# Patient Record
Sex: FEMALE | Race: OTHER
[De-identification: names, ages, dates, MRNs, and addresses within clinical notes are randomized per-mention and may not be internally consistent; named-entity substitution may affect disease eponyms.]

---

## 2018-09-28 ENCOUNTER — HOSPITAL ENCOUNTER (EMERGENCY)
Dept: HOSPITAL 80 - CED | Age: 23
Discharge: HOME | End: 2018-09-28
Payer: COMMERCIAL

## 2018-09-28 VITALS — DIASTOLIC BLOOD PRESSURE: 75 MMHG | SYSTOLIC BLOOD PRESSURE: 110 MMHG

## 2018-09-28 DIAGNOSIS — N90.7: Primary | ICD-10-CM

## 2018-09-28 PROCEDURE — 0H9AXZZ DRAINAGE OF INGUINAL SKIN, EXTERNAL APPROACH: ICD-10-PCS | Performed by: EMERGENCY MEDICINE

## 2018-09-28 NOTE — EDPHY
H & P


Stated Complaint: vagina cyst with pain 5 days


Time Seen by Provider: 09/28/18 17:15


HPI/ROS: 





CHIEF COMPLAINT:  Cyst on clitoris





History by patient





HISTORY OF PRESENT ILLNESS:  23-year-old otherwise healthy woman presents 

complaining of swollen painful area on her clitoris which began 3-4 days ago 

and has been getting progressively worse.  She states now it is so painful she 

can't sit with her legs close.  There has been no drainage.  She denies any 

trauma.  She has not put anything on it.  She has never had anything like this 

before.  She was unable to get an appointment with a gynecologist until next 

week.  Her mom is a nurse and suggested she come to the ER.








REVIEW OF SYSTEMS:


As in HPI, and all other systems reviewed and are negative


Source: Patient





- Personal History


LMP (Females 10-55): Over 28 Days Ago


Current Tetanus Diphtheria and Acellular Pertussis (TDAP): Yes





- Medical/Surgical History


Hx Asthma: No


Hx Chronic Respiratory Disease: No


Hx Diabetes: No


Hx Cardiac Disease: No


Hx Renal Disease: No


Hx Cirrhosis: No


Hx Alcoholism: No


Hx HIV/AIDS: No


Hx Splenectomy or Spleen Trauma: No


Other PMH: hymenectomy 2015





- Social History


Smoking Status: Never smoked





- Physical Exam


Exam: 





General Appearance:  Alert, well-appearing


Head: normocephalic, atraumatic


Eyes:  Pupils equal and round, reactive to light, no pallor or injection.


Mouth:  Mucous membranes moist.  Oropharynx clear


Neck:  No bony tenderness, full range of motion


Gastrointestinal:  Abdomen is soft and nontender, no masses, bowel sounds 

normal.


:  Positive 1 x 1 cm rounded cystic lesion with obvious purulence, induration 

and marked tenderness on the clitoris, labia and vaginal introitus within 

normal limits


Neurological:  Awake, alert and oriented x 3, cranial nerves 2-12 intact, no 

pronator drift, normal gait, 


Skin:  Warm and dry, no rashes.


Musculoskeletal:   No deformities or tenderness.


Extremities: full range of motion, no edema, DP2+ bilat


Psychiatric:  Patient has normal affect, there is no agitation.


Constitutional: 


 Initial Vital Signs











Temperature (C)  36.7 C   09/28/18 17:05


 


Heart Rate  93   09/28/18 17:05


 


Respiratory Rate  16   09/28/18 17:05


 


Blood Pressure  100/80   09/28/18 17:05


 


O2 Sat (%)  96   09/28/18 17:05








 











O2 Delivery Mode               Room Air














Allergies/Adverse Reactions: 


 





No Known Allergies Allergy (Unverified 09/28/18 17:10)


 








Home Medications: 














 Medication  Instructions  Recorded


 


Cephalexin 500 mg PO BID #20 capsule 09/28/18


 


Hydrocodone/APAP 5/325 [Norco 1 - 2 tab PO Q4H PRN #10 tab 09/28/18





5/325 (*)]  














Medical Decision Making


Procedures: 





Abscess aspiration:  Topical anesthetic was applied with Hurricaine spray and 

topical lidocaine gel.  22 gauge needle syringe was used to aspirate greater 

than 2 cc of purulent fluid.  Estimated blood loss minimal.  Complications:  

None.  Patient tolerated the procedure without difficulty.


ED Course/Re-evaluation: 





23-year-old woman presents with abscess on her clitoris.  Given the sensitivity 

of this area I was concerned about full I and D. We 10 to topical anesthesia 

with benzocaine spray with minimal relief and topical 2% lidocaine gel with 

some relief.  Needle aspiration was performed and we are able to express the 

several cc of purulent discharge with decrease in the size of a abscess.  I am 

recommending the patient continue with encouraging draining by doing warm Sitz 

baths or warm compresses multiple times a day.  We will start her on Keflex.  

He I recommending to return to the emerged from she gets a fever, the pain is 

uncontrolled or the abscess began some large in size again.  Patient has an 

appointment pending with gynecology on Tuesday.  I recommended she keep this 

appointment.  We discussed how if the abscess recurs or gets larger she may 

need I and D in the operating room given the extreme sensitivity of this area.  

Patient understands and is agreeable to this plan.  





- Data Points


Medications Given: 


 








Discontinued Medications





Benzocaine (Hurricaine Spray)  1 each MM EDNOW ONE


   Stop: 09/28/18 18:31


   Last Admin: 09/28/18 18:35 Dose:  1 each


Lidocaine (Uroject Lidocaine 2% Jelly)  20 ml UR EDNOW ONE


   Stop: 09/28/18 18:50


   Last Admin: 09/28/18 18:55 Dose:  20 ml








Departure





- Departure


Disposition: Home, Routine, Self-Care


Clinical Impression: 


 Abscess





Condition: Good


Instructions:  Abscess (ED)


Additional Instructions: 


You were seen by Dr. Deepti Blas today.





You have an abscess on your clitoris.  We have tried to aspirate the pus with a 

needle.  You need to do Sitz baths 3 times a day and or apply warm compresses 

to continue to let the abscess drain.





Take antibiotics as prescribed.  I recommend taking probiotics in between doses 

of antibiotics.





You may take ibuprofen 600 mg 4 times a day for pain.  If this isn't adequate 

you may take acetaminophen a 1000 mg 4 times a day or Norco 1-2 tabs every 6 

hr.  Do not take the acetaminophen with the Norco.





If the abscess gets bigger again, you get a fever or the pain is out of control 

please return to the emergency department.  Otherwise please follow up with her 

gynecologist as scheduled on Tuesday for re-evaluation.





 Return for any worsening or new concerns.


Referrals: 


NONE *PRIMARY CARE P,. [Primary Care Provider] - As per Instructions


Prescriptions: 


Cephalexin 500 mg PO BID #20 capsule


Hydrocodone/APAP 5/325 [Norco 5/325 (*)] 1 - 2 tab PO Q4H PRN #10 tab


 PRN Reason: Pain, Moderate